# Patient Record
Sex: MALE | Race: WHITE | Employment: FULL TIME | URBAN - METROPOLITAN AREA
[De-identification: names, ages, dates, MRNs, and addresses within clinical notes are randomized per-mention and may not be internally consistent; named-entity substitution may affect disease eponyms.]

---

## 2020-04-29 ENCOUNTER — HISTORICAL (OUTPATIENT)
Dept: LAB | Facility: HOSPITAL | Age: 50
End: 2020-04-29

## 2021-05-04 ENCOUNTER — HISTORICAL (OUTPATIENT)
Dept: ADMINISTRATIVE | Facility: HOSPITAL | Age: 51
End: 2021-05-04

## 2021-05-04 LAB
ABS NEUT (OLG): 2.7 X10(3)/MCL (ref 2.1–9.2)
ALBUMIN SERPL-MCNC: 4.6 GM/DL (ref 3.4–5)
ALBUMIN/GLOB SERPL: 2.09 {RATIO} (ref 1.5–2.5)
ALP SERPL-CCNC: 55 UNIT/L (ref 38–126)
ALT SERPL-CCNC: 22 UNIT/L (ref 7–52)
APPEARANCE, UA: CLEAR
AST SERPL-CCNC: 21 UNIT/L (ref 15–37)
BACTERIA #/AREA URNS AUTO: NORMAL /HPF
BILIRUB SERPL-MCNC: 0.7 MG/DL (ref 0.2–1)
BILIRUB UR QL STRIP: NEGATIVE MG/DL
BILIRUBIN DIRECT+TOT PNL SERPL-MCNC: 0.2 MG/DL (ref 0–0.5)
BILIRUBIN DIRECT+TOT PNL SERPL-MCNC: 0.5 MG/DL
BUN SERPL-MCNC: 17 MG/DL (ref 7–18)
CALCIUM SERPL-MCNC: 8.6 MG/DL (ref 8.5–10.1)
CHLORIDE SERPL-SCNC: 103 MMOL/L (ref 98–107)
CHOLEST SERPL-MCNC: 193 MG/DL (ref 0–200)
CHOLEST/HDLC SERPL: 3.8 {RATIO}
CO2 SERPL-SCNC: 29 MMOL/L (ref 21–32)
COLOR UR: YELLOW
CREAT SERPL-MCNC: 0.89 MG/DL (ref 0.6–1.3)
ERYTHROCYTE [DISTWIDTH] IN BLOOD BY AUTOMATED COUNT: 12.5 % (ref 11.5–17)
EST CREAT CLEARANCE SER (OHS): 116.15 ML/MIN
GLOBULIN SER-MCNC: 2.2 GM/DL (ref 1.2–3)
GLUCOSE (UA): NEGATIVE MG/DL
GLUCOSE SERPL-MCNC: 96 MG/DL (ref 74–106)
HCT VFR BLD AUTO: 42.6 % (ref 42–52)
HDLC SERPL-MCNC: 51 MG/DL (ref 35–60)
HGB BLD-MCNC: 13.8 GM/DL (ref 14–18)
HGB UR QL STRIP: NEGATIVE UNIT/L
KETONES UR QL STRIP: NEGATIVE MG/DL
LDLC SERPL CALC-MCNC: 121 MG/DL (ref 0–129)
LEUKOCYTE ESTERASE UR QL STRIP: NEGATIVE UNIT/L
LYMPHOCYTES # BLD AUTO: 2 X10(3)/MCL (ref 0.6–3.4)
LYMPHOCYTES NFR BLD AUTO: 38.6 % (ref 13–40)
MCH RBC QN AUTO: 28.5 PG (ref 27–31.2)
MCHC RBC AUTO-ENTMCNC: 32 GM/DL (ref 32–36)
MCV RBC AUTO: 88 FL (ref 80–94)
MONOCYTES # BLD AUTO: 0.6 X10(3)/MCL (ref 0.1–1.3)
MONOCYTES NFR BLD AUTO: 10.9 % (ref 0.1–24)
NEUTROPHILS NFR BLD AUTO: 50.5 % (ref 47–80)
NITRITE UR QL STRIP.AUTO: NEGATIVE
PH UR STRIP: 7.5 [PH]
PLATELET # BLD AUTO: 244 X10(3)/MCL (ref 130–400)
PMV BLD AUTO: 8.9 FL (ref 9.4–12.4)
POTASSIUM SERPL-SCNC: 4.4 MMOL/L (ref 3.5–5.1)
PROT SERPL-MCNC: 6.8 GM/DL (ref 6.4–8.2)
PROT UR QL STRIP: NEGATIVE MG/DL
PSA SERPL-MCNC: 2.34 NG/ML (ref 0–3.5)
RBC # BLD AUTO: 4.85 X10(6)/MCL (ref 4.7–6.1)
RBC #/AREA URNS HPF: NORMAL /HPF
SODIUM SERPL-SCNC: 140 MMOL/L (ref 136–145)
SP GR UR STRIP: 1.02
SQUAMOUS EPITHELIAL, UA: NORMAL /LPF
TRIGL SERPL-MCNC: 108 MG/DL (ref 30–150)
UROBILINOGEN UR STRIP-ACNC: 0.2 MG/DL
VLDLC SERPL CALC-MCNC: 21.6 MG/DL
WBC # SPEC AUTO: 5.3 X10(3)/MCL (ref 4.5–11.5)
WBC #/AREA URNS AUTO: NORMAL /[HPF]

## 2022-04-09 ENCOUNTER — HISTORICAL (OUTPATIENT)
Dept: ADMINISTRATIVE | Facility: HOSPITAL | Age: 52
End: 2022-04-09

## 2022-04-25 VITALS
HEIGHT: 74 IN | BODY MASS INDEX: 23.4 KG/M2 | WEIGHT: 182.31 LBS | SYSTOLIC BLOOD PRESSURE: 114 MMHG | DIASTOLIC BLOOD PRESSURE: 82 MMHG | OXYGEN SATURATION: 97 %

## 2022-05-02 NOTE — HISTORICAL [facility] CERNER
This is a historical note converted from Shirlene. Formatting and pictures may have been removed.  Please reference Shirlene for original formatting and attached multimedia. Chief Complaint  ANNUAL WELLNESS-NPO  History of Present Illness  Patient presents for wellness exam.  He has been feeling well.  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 He has?interview today for a local position. ? He lives with his spouse and 3 sons: 23,?20 and 17. His oldest got  last November.  He sleeps well.  His appetite is very good.  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 When he is home, he and wife walks in the park at [place].  He has alcohol 3-5 times per week; averages?5-6?drinks per time.  He has?16 ounces of?coffee a day and?1 soda a day with lunch.?  He does not smoke.  Pt has been vaccinated for COVID; last vaccine 4/22/2021.  Was followed by [facility] in the past for asymptomatic PVCs--Holter/stress test workup negative; FU PRN.  Review of Systems  Constitutional:??no?weight gain,??no?weight loss,??no?fatigue, ?no?fever, ?no?chills, ?no?weakness, ?no?trouble sleeping  Eyes: ?no?vision loss/changes,??+?glasses?and contacts,??no?pain,??no?redness,??no?blurry or double vision,??no?flashing lights,??no?glaucoma,??no?cataracts  Last eye exam:?about 1 year ago  Neck: ?no?lymphadenopathy,??no?thyroid abnormalities,??no?bruits,??no?stiffness  Ears:??no?decreased hearing,??no?tinnitus,??no?earache,??no?drainage?  Nose:??no?congestion,??no?rhinorrhea,??no?epistaxis,??no?sinus pressure  Throat/Oral:??no?sore throat,??no?hoarseness, ?no?dental caries,??no?gum bleeding,??no?oral lesions  Cardiovascular:??no?chest pain, palpitations, or tightness,??no?dyspnea with exertion,??no?orthopnea,??no?paroxysmal nocturnal dyspnea, h/o asymptomatic PVCs  Respiratory:??no?cough,??no?sputum,??no?hemoptysis,??no?dyspnea,??no?wheezing,??no?pleuritic chest pain?  Gastrointestinal:??no?abdominal  pain,??no?nausea,??no?vomiting,??no?heartburn,??no?dysphagia or odynophagia,??no?diarrhea,??no?constipation,??no?melena,??no?hematochezia,?no?jaundice  Urinary:??no?frequency,??no?urgency,??no?burning or pain,?no?hematuria,??no?incontinence,??no?hesitancy,??no?incomplete voiding,??no?flank pain,??no?nocturia, + erectile dysfunction on occasion, father had enlarged prostate and had to have a procedure  Musculoskeletal:?no?myalgias,??no?arthralgias,?no?neck pain,??no?back pain,??no?swelling of extremities  Skin:?no?rashes,??no?sores,??no?non-healing wounds  Neurologic:??no?headaches,??no?dizziness/lightheadedness,??no?tremors,??no?paresthesias,??no?seizures,??no?muscle weakness  Psychiatric:??no?depression/sadness,??no?anhedonia,??no?irritability,??no?suicidal ideations,??no?anxiety,??no?panic attacks  Endocrine:??no?hot or cold intolerance,??no?sweating,??no?polyuria,??no?polydipsia,??no?polyphagia  Hematologic:??no?bruising,??no?bleeding disorders?  ?  Physical Exam  Vitals & Measurements  T:?36.7? ?C (Oral)? HR:?66(Peripheral)? BP:?114/82? SpO2:?97%?  HT:?187.96?cm? HT:?187.96?cm? WT:?82.700?kg? WT:?82.7?kg? BMI:?23.41?  ?  GENERAL: The patient is a well-developed, well-nourished white?male in no?apparent distress. He is alert and oriented x 4.  HEENT: Head is normocephalic and atraumatic. Extraocular muscles are intact. Pupils are equal, round, and reactive to light and accommodation. Nares appeared normal. Mouth is well hydrated and without lesions. Mucous membranes are moist. Posterior pharynx clear of any exudate or lesions.  NECK: Supple. No carotid bruits.? No lymphadenopathy or thyromegaly.  LUNGS: Clear to auscultation.  HEART: Regular rate and rhythm without murmur, gallops or rubs.  ABDOMEN: Soft, nontender, and nondistended.? Positive bowel sounds.? No hepatosplenomegaly was noted.  EXTREMITIES: Without any cyanosis, clubbing, rash, lesions or edema.  NEUROLOGIC: Cranial nerves II through XII are grossly  intact.? No motor or sensory deficits.? Cerebellar function intact.  SKIN: No ulceration or induration present.  :? Normal male genitalia, no hernias, testes descended with normal size and consistency, ??NST,??prostate soft, smooth and without nodules.  ?  Assessment/Plan  1.?Wellness examination?Z00.00  Patient presents for wellness examination.  He has been feeling well.  He has received?2 COVID vaccines.  He is willing to do a colonoscopy; we will refer him.  He will?he need a Shingrix vaccine.?  Labs pending.  ?  Ordered:  Automated Diff, Routine collect, 05/04/21 7:57:00 CDT, Blood, Collected, Stop date 05/04/21 7:57:00 CDT, Lab Collect, Wellness examination, 05/04/21 7:57:00 CDT  CBC w/ Auto Diff, Routine collect, 05/04/21 7:57:00 CDT, Blood, Stop date 05/04/21 7:57:00 CDT, Lab Collect, Wellness examination, 05/04/21 7:57:00 CDT  Clinic Follow-Up Wellness, *Est. 05/04/22 3:00:00 CDT, Order for future visit, Wellness examination, [facility]  Comprehensive Metabolic Panel, Routine collect, 05/04/21 7:57:00 CDT, Blood, Stop date 05/04/21 7:57:00 CDT, Lab Collect, Wellness examination, 05/04/21 7:57:00 CDT  Lipid Panel, Routine collect, 05/04/21 7:57:00 CDT, Blood, Stop date 05/04/21 7:57:00 CDT, Lab Collect, Wellness examination, 05/04/21 7:57:00 CDT  Preventative Health Care Est 40-64 years [address] PC, Wellness examination  Erectile dysfunction  Asymptomatic PVCs, [facility], 05/04/21 7:54:00 CDT  Prostate Specific Antigen, Routine collect, 05/04/21 7:57:00 CDT, Blood, Stop date 05/04/21 7:57:00 CDT, Lab Collect, Wellness examination, 05/04/21 7:57:00 CDT  Urinalysis no Reflex, Routine collect, Urine, 05/04/21 7:57:00 CDT, Stop date 05/04/21 7:57:00 CDT, Nurse collect, Wellness examination  ?  2.?Erectile dysfunction?N52.9  Patient has occasional issues with this;?refill for sildenafil sent to pharmacy.  Ordered:  Preventative Health Care Est 40-64 years [address] PC, Wellness examination  Erectile  dysfunction  Asymptomatic PVCs, [facility] - [facility], 05/04/21 7:54:00 CDT  ?  3.?Asymptomatic PVCs?I49.3  ?Asymptomatic.  Ordered:  Preventative Health Care Est 40-64 years [id number] PC, Wellness examination  Erectile dysfunction  Asymptomatic PVCs, [facility], 05/04/21 7:54:00 CDT  ?  Orders:  sildenafil, See Instructions, 1 tab(s) Oral 1 hour before sexual activity, # 10 tab(s), 2 Refill(s), Pharmacy: [facility] #[id number], 187.96, cm, Height/Length Dosing, 05/04/21 7:27:00 CDT, 82.7, kg, Weight Dosing, 05/04/21 7:27:00 CDT  External Referral, Screening colonoscopy, GI, Dr. Hernandez, 05/04/21 8:00:00 CDT, Colon cancer screening  Referrals  External Referral, Screening colonoscopy, GI, Dr. Hernandez, 05/04/21 8:00:00 CDT, Colon cancer screening  Clinic Follow-Up Wellness, *Est. 05/04/22 3:00:00 CDT, Order for future visit, Wellness examination, [facility]   Problem List/Past Medical History  Ongoing  Asymptomatic PVCs  Erectile dysfunction  Historical  ADD - Attention deficit disorder  GERD - Gastro-esophageal reflux disease  HSV - Herpes simplex virus  PVC  Procedure/Surgical History  Vasectomy (04/18/2013)   Medications  Viagra 100 mg oral tablet, See Instructions, 2 refills  Allergies  penicillin?(Unknown)  Social History  Abuse/Neglect  No, 05/04/2021  Yes, , 04/29/2020  No, 04/29/2020  Alcohol  Current, Beer, 3-5 times per week, 05/04/2021  Current, Beer, 3-5 times per week, 04/29/2020  Current, 3-5 times per week, 04/19/2018  Employment/School  Employed, Work/School description: ., 05/04/2021  Employed, Work/School description:  [employer]., 04/19/2018  Exercise  Exercise duration: 0., 04/29/2020  Home/Environment  Lives with Children, Spouse. Living situation: Home/Independent., 05/04/2021  Lives with Children, Spouse. Living situation: Home/Independent., 04/29/2020  Lives with Children, Spouse. Living situation: Home/Independent.,  04/23/2019  Nutrition/Health  Regular, Good, 05/04/2021  Regular, Good, 04/29/2020  Regular, Caffeine intake amount: 16 OUNCES OF COFFEE PER DAY., 04/19/2018  Substance Use  Never, 05/04/2021  Never, 04/29/2020  Never, 04/19/2018  Tobacco  Former smoker, quit more than 30 days ago, No, 05/04/2021  Former smoker, quit more than 30 days ago, No, 04/29/2020  Former smoker, quit more than 30 days ago, N/A, 04/23/2019  Former smoker, Started age 19 Years. Stopped age 23 Years., 04/19/2018  Family History  BPH - Benign prostatic hypertrophy: Father.  CAD - Coronary artery disease: Mother and Father.  Hypertension: Father.  Immunizations  Vaccine Date Status   COVID-19 MRNA, LNP-S, PF- Pfizer 04/22/2021 Given   COVID-19 MRNA, LNP-S, PF- Pfizer 04/01/2021 Given   tetanus/diphtheria/pertussis, acel(Tdap) 09/03/2016 Recorded   tetanus/diphtheria/pertussis, acel(Tdap) 09/29/2008 Recorded   Health Maintenance  Health Maintenance  ???Pending?(in the next year)  ??? ??OverDue  ??? ? ? ?Influenza Vaccine due??10/01/20??and every 1??day(s)  ??? ? ? ?Alcohol Misuse Screening due??01/02/21??and every 1??year(s)  ??? ??Due?  ??? ? ? ?ADL Screening due??05/04/21??and every 1??year(s)  ??? ? ? ?Aspirin Therapy for CVD Prevention due??05/04/21??and every 1??year(s)  ??? ? ? ?Colorectal Screening due??05/04/21??Unknown Frequency  ??? ? ? ?Depression Screening due??05/04/21??Unknown Frequency  ??? ? ? ?Zoster Vaccine due??05/04/21??Unknown Frequency  ??? ??Due In Future?  ??? ? ? ?Obesity Screening not due until??01/01/22??and every 1??year(s)  ???Satisfied?(in the past 1 year)  ??? ??Satisfied?  ??? ? ? ?Blood Pressure Screening on??05/04/21.??Satisfied by Gavin Dennis LPN  ??? ? ? ?Body Mass Index Check on??05/04/21.??Satisfied by Gavin Dennis LPN  ??? ? ? ?Obesity Screening on??05/04/21.??Satisfied by Gavin Dennis LPN  ?